# Patient Record
Sex: MALE | Race: BLACK OR AFRICAN AMERICAN | NOT HISPANIC OR LATINO | Employment: UNEMPLOYED | URBAN - METROPOLITAN AREA
[De-identification: names, ages, dates, MRNs, and addresses within clinical notes are randomized per-mention and may not be internally consistent; named-entity substitution may affect disease eponyms.]

---

## 2017-04-25 ENCOUNTER — HISTORICAL (OUTPATIENT)
Dept: RADIOLOGY | Facility: HOSPITAL | Age: 57
End: 2017-04-25

## 2017-10-17 ENCOUNTER — HISTORICAL (OUTPATIENT)
Dept: LAB | Facility: HOSPITAL | Age: 57
End: 2017-10-17

## 2021-12-23 ENCOUNTER — HISTORICAL (OUTPATIENT)
Dept: RADIOLOGY | Facility: HOSPITAL | Age: 61
End: 2021-12-23

## 2021-12-27 ENCOUNTER — HISTORICAL (OUTPATIENT)
Dept: RADIOLOGY | Facility: HOSPITAL | Age: 61
End: 2021-12-27

## 2022-04-04 ENCOUNTER — HISTORICAL (OUTPATIENT)
Dept: ADMINISTRATIVE | Facility: HOSPITAL | Age: 62
End: 2022-04-04

## 2022-04-04 ENCOUNTER — HISTORICAL (OUTPATIENT)
Dept: RADIOLOGY | Facility: HOSPITAL | Age: 62
End: 2022-04-04

## 2023-09-22 DIAGNOSIS — K11.1 HYPERTROPHY OF SALIVARY GLAND: Primary | ICD-10-CM

## 2023-09-29 ENCOUNTER — HOSPITAL ENCOUNTER (OUTPATIENT)
Dept: RADIOLOGY | Facility: HOSPITAL | Age: 63
Discharge: HOME | End: 2023-09-29
Attending: INTERNAL MEDICINE
Payer: MEDICAID

## 2023-09-29 DIAGNOSIS — K11.1 HYPERTROPHY OF SALIVARY GLAND: ICD-10-CM

## 2023-09-29 PROCEDURE — 76536 US EXAM OF HEAD AND NECK: CPT | Mod: TC

## 2023-10-18 DIAGNOSIS — K11.1 HYPERTROPHY OF SALIVARY GLAND: Primary | ICD-10-CM

## 2023-10-25 ENCOUNTER — HOSPITAL ENCOUNTER (OUTPATIENT)
Dept: RADIOLOGY | Facility: HOSPITAL | Age: 63
Discharge: HOME | End: 2023-10-25
Attending: INTERNAL MEDICINE
Payer: MEDICAID

## 2023-10-25 DIAGNOSIS — K11.1 HYPERTROPHY OF SALIVARY GLAND: ICD-10-CM

## 2023-10-25 LAB
CREAT SERPL-MCNC: 1 MG/DL (ref 0.5–1.4)
SAMPLE: NORMAL

## 2023-10-25 PROCEDURE — 25500020 PHARM REV CODE 255: Performed by: INTERNAL MEDICINE

## 2023-10-25 PROCEDURE — 70492 CT SFT TSUE NCK W/O & W/DYE: CPT | Mod: TC

## 2023-10-25 RX ADMIN — IOPAMIDOL 100 ML: 755 INJECTION, SOLUTION INTRAVENOUS at 11:10

## 2024-04-25 ENCOUNTER — LAB VISIT (OUTPATIENT)
Dept: LAB | Facility: HOSPITAL | Age: 64
End: 2024-04-25
Attending: INTERNAL MEDICINE

## 2024-04-25 DIAGNOSIS — E78.00 PURE HYPERCHOLESTEROLEMIA: Primary | ICD-10-CM

## 2024-04-25 LAB
ALBUMIN SERPL-MCNC: 4.3 G/DL (ref 3.4–4.8)
ALBUMIN/GLOB SERPL: 1.3 RATIO (ref 1.1–2)
ALP SERPL-CCNC: 63 UNIT/L (ref 40–150)
ALT SERPL-CCNC: 22 UNIT/L (ref 0–55)
AST SERPL-CCNC: 27 UNIT/L (ref 5–34)
BILIRUB SERPL-MCNC: 0.8 MG/DL
BUN SERPL-MCNC: 11 MG/DL (ref 8.4–25.7)
CALCIUM SERPL-MCNC: 9.6 MG/DL (ref 8.8–10)
CHLORIDE SERPL-SCNC: 106 MMOL/L (ref 98–107)
CO2 SERPL-SCNC: 22 MMOL/L (ref 23–31)
CREAT SERPL-MCNC: 1.02 MG/DL (ref 0.73–1.18)
GFR SERPLBLD CREATININE-BSD FMLA CKD-EPI: >60 MLS/MIN/1.73/M2
GLOBULIN SER-MCNC: 3.4 GM/DL (ref 2.4–3.5)
GLUCOSE SERPL-MCNC: 197 MG/DL (ref 82–115)
POTASSIUM SERPL-SCNC: 3.2 MMOL/L (ref 3.5–5.1)
PROT SERPL-MCNC: 7.7 GM/DL (ref 5.8–7.6)
SODIUM SERPL-SCNC: 137 MMOL/L (ref 136–145)

## 2024-04-25 PROCEDURE — 36415 COLL VENOUS BLD VENIPUNCTURE: CPT

## 2024-04-25 PROCEDURE — 80053 COMPREHEN METABOLIC PANEL: CPT

## 2024-11-11 ENCOUNTER — HOSPITAL ENCOUNTER (EMERGENCY)
Facility: HOSPITAL | Age: 64
Discharge: HOME | End: 2024-11-11
Attending: INTERNAL MEDICINE
Payer: MEDICARE

## 2024-11-11 VITALS
HEIGHT: 70 IN | RESPIRATION RATE: 16 BRPM | TEMPERATURE: 99 F | DIASTOLIC BLOOD PRESSURE: 113 MMHG | BODY MASS INDEX: 29.35 KG/M2 | SYSTOLIC BLOOD PRESSURE: 174 MMHG | OXYGEN SATURATION: 97 % | WEIGHT: 205 LBS | HEART RATE: 69 BPM

## 2024-11-11 DIAGNOSIS — R51.9 ACUTE NONINTRACTABLE HEADACHE, UNSPECIFIED HEADACHE TYPE: ICD-10-CM

## 2024-11-11 DIAGNOSIS — R40.20 LOC (LOSS OF CONSCIOUSNESS): Primary | ICD-10-CM
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
## 2024-11-11 DIAGNOSIS — T14.8XXA ABRASION: ICD-10-CM

## 2024-11-11 DIAGNOSIS — S09.90XA INJURY OF HEAD, INITIAL ENCOUNTER: ICD-10-CM

## 2024-11-11 DIAGNOSIS — S00.12XA BLACK EYE OF LEFT SIDE, INITIAL ENCOUNTER: ICD-10-CM

## 2024-11-11 PROCEDURE — 25000003 PHARM REV CODE 250

## 2024-11-11 PROCEDURE — 99284 EMERGENCY DEPT VISIT MOD MDM: CPT | Mod: 25

## 2024-11-11 RX ORDER — AMLODIPINE BESYLATE 5 MG/1
10 TABLET ORAL
Status: COMPLETED | OUTPATIENT
Start: 2024-11-11 | End: 2024-11-11

## 2024-11-11 RX ORDER — TRAMADOL HYDROCHLORIDE 50 MG/1
50 TABLET ORAL EVERY 8 HOURS PRN
Qty: 15 TABLET | Refills: 0 | Status: SHIPPED | OUTPATIENT
Start: 2024-11-11 | End: 2024-11-16

## 2024-11-11 RX ORDER — BUTALBITAL, ACETAMINOPHEN AND CAFFEINE 50; 325; 40 MG/1; MG/1; MG/1
1 TABLET ORAL
Status: COMPLETED | OUTPATIENT
Start: 2024-11-11 | End: 2024-11-11

## 2024-11-11 RX ADMIN — AMLODIPINE BESYLATE 10 MG: 5 TABLET ORAL at 05:11

## 2024-11-11 RX ADMIN — BUTALBITAL, ACETAMINOPHEN, AND CAFFEINE 1 TABLET: 50; 325; 40 TABLET ORAL at 04:11

## 2024-11-11 NOTE — DISCHARGE INSTRUCTIONS
Take Tylenol or ibuprofen as needed for pain.  Take tramadol as needed for more severe pain.  May also apply ice to your face as needed for pain and swelling.  Follow up with your primary care provider as needed.  If you experience any new or worsening symptoms return to the emergency department

## 2024-11-11 NOTE — ED PROVIDER NOTES
Encounter Date: 11/11/2024       History     Chief Complaint   Patient presents with    Fall    Loss of Consciousness     C/o possibly passing out Saturday night and fell hitting left eye     See MDM        Review of patient's allergies indicates:   Allergen Reactions    Codeine     Penicillins Other (See Comments)     History reviewed. No pertinent past medical history.  No past surgical history on file.  No family history on file.  Social History     Tobacco Use    Smoking status: Former     Types: Cigarettes    Smokeless tobacco: Never     Review of Systems   Constitutional:         Fall with LOC on saturday   Eyes:  Positive for pain (pain and swelling over L eye).   Neurological:  Positive for headaches.   Psychiatric/Behavioral:  Positive for confusion.    All other systems reviewed and are negative.      Physical Exam     Initial Vitals [11/11/24 1515]   BP Pulse Resp Temp SpO2   (!) 184/109 82 16 98.6 °F (37 °C) 99 %      MAP       --         Physical Exam    Nursing note and vitals reviewed.  Constitutional: He appears well-developed and well-nourished.   HENT:   Head: Normocephalic.   Eyes: EOM are normal. Pupils are equal, round, and reactive to light.       Contusion with associated swelling and abrasion noted to the L surrounding eye    Cardiovascular:  Normal rate and regular rhythm.           Pulmonary/Chest: Breath sounds normal.   Musculoskeletal:      Cervical back: No tenderness or bony tenderness.      Thoracic back: No tenderness or bony tenderness.      Lumbar back: No tenderness or bony tenderness.     Neurological: He is alert and oriented to person, place, and time. He has normal strength. No cranial nerve deficit or sensory deficit. He displays a negative Romberg sign. Coordination and gait normal.   Skin: Skin is warm and dry.   Psychiatric: He has a normal mood and affect.         ED Course   Procedures  Labs Reviewed - No data to display       Imaging Results              CT Head Without  Contrast (Final result)  Result time 11/11/24 16:28:40      Final result by Yomi Palacios MD (11/11/24 16:28:40)                   Impression:      No acute intracranial abnormality is identified.  Soft tissue hematoma is identified superior and lateral to the left orbit without underlying bony fracture      Electronically signed by: Yomi Palacios  Date:    11/11/2024  Time:    16:28               Narrative:    EXAMINATION:  CT HEAD WITHOUT CONTRAST    CLINICAL HISTORY:  Facial trauma, blunt;    TECHNIQUE:  Low dose axial CT images obtained throughout the head without intravenous contrast. Sagittal and coronal reconstructions were performed.    COMPARISON:  None.    FINDINGS:  Intracranial compartment:    Ventricles and sulci are normal in size for age without evidence of hydrocephalus. No extra-axial blood or fluid collections.    The brain parenchyma appears normal. No parenchymal mass, hemorrhage, edema or major vascular distribution infarct.  Intracranial atherosclerosis is identified.    Skull/extracranial contents (limited evaluation): Large soft tissue hematoma is identified along the superior and lateral aspect of the left orbit.  No underlying bony fracture is seen.  Tiny radiopaque foreign bodies are identified along the skin along the temporal region on the left.  Mastoid air cells and paranasal sinuses are essentially clear.                                       CT Maxillofacial Without Contrast (Final result)  Result time 11/11/24 16:30:50      Final result by Yomi Palacios MD (11/11/24 16:30:50)                   Impression:      Soft tissue hematoma is identified lateral to the left orbit.  No acute bony fractures are seen.      Electronically signed by: Yomi Palacios  Date:    11/11/2024  Time:    16:30               Narrative:    EXAMINATION:  CT MAXILLOFACIAL WITHOUT CONTRAST    CLINICAL HISTORY:  Facial trauma, blunt;    TECHNIQUE:  Low dose axial images, sagittal and  coronal reformations were obtained through the face.  Contrast was not administered.    Total DLP: 416 mGy.cm    Automatic exposure control was utilized to reduce the patient's dose    COMPARISON:  None    FINDINGS:  Subcutaneous scalp hematoma is identified along the lateral and superior portions of the left orbit.  The globes and intraorbital contents appear unremarkable.  Tiny radiopacities are identified in the skin at the level of the zygomatic arch.  This may represent tiny foreign bodies.  Correlation with physical exam is required.  No retropharyngeal masses or soft tissue swelling are identified.  The paranasal sinuses appear unremarkable.  The orbits appear normal.  No acute bony fractures are seen.  The mandible and temporomandibular joints appear unremarkable.                                       CT Cervical Spine Without Contrast (Final result)  Result time 11/11/24 16:29:26   Procedure changed from CT Soft Tissue Neck WO Contrast     Final result by Galo Sheridan MD (11/11/24 16:29:26)                   Impression:      1. No acute osseous defect identified  2. Cervical spondylosis  3. Straightening of normal lordotic curve with the mild dextro convexity of the cervical spine  4. Findings and other details as above      Electronically signed by: Galo Sheridan  Date:    11/11/2024  Time:    16:29               Narrative:    EXAMINATION:  CT CERVICAL SPINE WITHOUT CONTRAST    CLINICAL HISTORY:  , Fall with head trauma 64yo;    TECHNIQUE,:  PATIENT RADIATION DOSE: DLP(mGycm) 1769    As per PQRS measures, all CT scans at this facility used dose modulation, iterative reconstruction, and/or weight based dose adjustment when appropriate to reduce radiation dose to as low as reasonably achievable.    COMPARISON:  None available    FINDINGS:  Serial axial images were obtained of the cervical spine without the administration of intravenous contrast.  Additional coronal and sagittal images were performed.   "Both soft tissue and bone windows were obtained. Vertebral body height and alignment are grossly intact.  No fracture or subluxation is seen.  There is no loss of integrity to the bony spinal canal.  There is straightening of the normal lordotic curve.  There is slight dextro convexity of the cervical spine.  There is multilevel mild-to-moderate encroachment into the central spinal canal and neural foramina secondary to posterior osteophyte formation, facet the arthrosis, and poorly visualized posterior disc bulge.  Atherosclerosis is seen within the carotid bulbs bilaterally.  Thyroid lobes are relatively symmetric in size.  Lung apices are mostly clear.                                       Medications   butalbital-acetaminophen-caffeine -40 mg per tablet 1 tablet (1 tablet Oral Given 11/11/24 1643)   amLODIPine tablet 10 mg (10 mg Oral Given 11/11/24 1735)     Medical Decision Making  This is a 63-year-old  male with a PMHx of HTN, DM, HLD, anxiety who presents to the emergency department with his wife reporting a fall that occurred this past Saturday.  Patient states that he was at home lying in the recliner and whenever he got up to get out of the chair he tripped and fell and hit his head on the side low table.  Patient states that he was "out for hours".  Patient states that he received a text message on his phone prior to the fall(around 4:00 p.m.) and then received a call from his wife around 6:00 p.m. which is about the time he was regaining consciousness.  Patient's wife states that her  sounded "confused, and fatigued".  Patient states it took him a few minutes to come back to normal.  Patient has also a headache since the fall and has been taken Tylenol for this.  He endorses dizziness as well as some confusion.  He denies any nausea, vomiting, changes in vision, or any other symptoms at this time.    Physical exam pertinent for left eye contusion with associated swelling, " "and abrasion.  CT cervical, CT head, CT maxillofacial ordered due to mechanism of injury , symptoms, and age of the patient.  Fioricet given for headache.  Patient noted to be hypertensive to the 180s / 100s - patient states he took all his medications today.    CT cervical, CT head, CT maxillofacial all without any acute abnormalities.  Patient given a dose of amlodipine 10 for blood pressure while inpatient.  Patient given discharge instructions and return precautions.  Patient is wife verbalized understanding agreement of plan.  All questions answered.      Amount and/or Complexity of Data Reviewed  Independent Historian: spouse     Details: This is a 63-year-old  male with a PMHx of HTN, DM, HLD, anxiety who presents to the emergency department with his wife reporting a fall that occurred this past Saturday.  Patient states that he was at home lying in the recliner and whenever he got up to get out of the chair he tripped and fell and hit his head on the side low table.  Patient states that he was "out for hours".  Patient states that he received a text message on his phone prior to the fall(around 4:00 p.m.) and then received a call from his wife around 6:00 p.m. which is about the time he was regaining consciousness.  Patient's wife states that her  sounded "confused, and fatigued".  Patient states it took him a few minutes to come back to normal.  Patient has also a headache since the fall and has been taken Tylenol for this.  He endorses dizziness as well as some confusion.  He denies any nausea, vomiting, changes in vision, or any other symptoms at this time.  Radiology: ordered. Decision-making details documented in ED Course.    Risk  Prescription drug management.      Additional MDM:   Differential Diagnosis:   Symptom: Headache. <> The follow diagnoses were considered and will be evaluated: Intracranial Hemorrhage, Post Trauma HA and Subdural Hematoma.                             "         Clinical Impression:  Final diagnoses:  [R40.20] LOC (loss of consciousness) (Primary)  [W19.XXXA] Fall, initial encounter  [T14.8XXA] Abrasion  [S09.90XA] Injury of head, initial encounter  [R51.9] Acute nonintractable headache, unspecified headache type  [S00.12XA] Black eye of left side, initial encounter          ED Disposition Condition    Discharge Stable          ED Prescriptions       Medication Sig Dispense Start Date End Date Auth. Provider    traMADoL (ULTRAM) 50 mg tablet Take 1 tablet (50 mg total) by mouth every 8 (eight) hours as needed for Pain. 15 tablet 11/11/2024 11/16/2024 Brianne Ortega PA-C          Follow-up Information       Follow up With Specialties Details Why Contact Info    Richard Galvez MD Internal Medicine Call in 1 week As needed [address] [facility]  [place], [facility] [address]  [phone]               Brianne Ortega PA-C  11/11/24 6849

## 2024-11-11 NOTE — Clinical Note
"Jason Coronado" Arvin was seen and treated in our emergency department on 11/11/2024.  He may return to work on 11/18/2024.       If you have any questions or concerns, please don't hesitate to call.      Brianne Ortega PA-C"

## 2024-11-13 ENCOUNTER — PATIENT MESSAGE (OUTPATIENT)
Dept: RESEARCH | Facility: HOSPITAL | Age: 64
End: 2024-11-13
Payer: MEDICARE

## 2025-01-13 ENCOUNTER — HOSPITAL ENCOUNTER (EMERGENCY)
Facility: HOSPITAL | Age: 65
Discharge: HOME | End: 2025-01-13
Attending: EMERGENCY MEDICINE
Payer: COMMERCIAL

## 2025-01-13 VITALS
DIASTOLIC BLOOD PRESSURE: 102 MMHG | HEIGHT: 70 IN | TEMPERATURE: 98 F | SYSTOLIC BLOOD PRESSURE: 175 MMHG | HEART RATE: 78 BPM | OXYGEN SATURATION: 96 % | BODY MASS INDEX: 28.63 KG/M2 | WEIGHT: 200 LBS | RESPIRATION RATE: 16 BRPM

## 2025-01-13 DIAGNOSIS — S05.12XA CONTUSION OF LEFT ORBIT, INITIAL ENCOUNTER: ICD-10-CM
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
## 2025-01-13 DIAGNOSIS — R51.9 NONINTRACTABLE HEADACHE, UNSPECIFIED CHRONICITY PATTERN, UNSPECIFIED HEADACHE TYPE: ICD-10-CM

## 2025-01-13 DIAGNOSIS — T07.XXXA MULTIPLE ABRASIONS: ICD-10-CM

## 2025-01-13 DIAGNOSIS — S30.1XXA CONTUSION OF ABDOMINAL WALL, INITIAL ENCOUNTER: ICD-10-CM

## 2025-01-13 LAB
GLUCOSE SERPL-MCNC: 166 MG/DL (ref 70–110)
POC ANION GAP: 16 MMOL/L
POC BUN: 8 MMOL/L
POC CHLORIDE: 99 MMOL/L
POC CREATININE: 0.8 MG/DL (ref 0.6–1.3)
POC HEMATOCRIT: 42 %PCV (ref 42–52)
POC HEMOGLOBIN: 14.3 G/DL (ref 13.5–18)
POC ICA: 1.17 MMOL/L
POC POTASSIUM: 3.5 MMOL/L
POC SODIUM: 140 MMOL/L
POC TCO2: 29 MMOL/L

## 2025-01-13 PROCEDURE — 82565 ASSAY OF CREATININE: CPT | Mod: 59

## 2025-01-13 PROCEDURE — 99285 EMERGENCY DEPT VISIT HI MDM: CPT | Mod: 25

## 2025-01-13 PROCEDURE — 25000003 PHARM REV CODE 250: Performed by: NURSE PRACTITIONER

## 2025-01-13 PROCEDURE — 25500020 PHARM REV CODE 255: Performed by: NURSE PRACTITIONER

## 2025-01-13 PROCEDURE — 96374 THER/PROPH/DIAG INJ IV PUSH: CPT

## 2025-01-13 PROCEDURE — 63600175 PHARM REV CODE 636 W HCPCS: Performed by: NURSE PRACTITIONER

## 2025-01-13 PROCEDURE — 80047 BASIC METABLC PNL IONIZED CA: CPT

## 2025-01-13 PROCEDURE — 82962 GLUCOSE BLOOD TEST: CPT

## 2025-01-13 RX ORDER — KETOROLAC TROMETHAMINE 30 MG/ML
30 INJECTION, SOLUTION INTRAMUSCULAR; INTRAVENOUS
Status: COMPLETED | OUTPATIENT
Start: 2025-01-13 | End: 2025-01-13

## 2025-01-13 RX ORDER — NAPROXEN 500 MG/1
500 TABLET ORAL 2 TIMES DAILY WITH MEALS
Qty: 60 TABLET | Refills: 0 | Status: SHIPPED | OUTPATIENT
Start: 2025-01-13

## 2025-01-13 RX ORDER — TRAMADOL HYDROCHLORIDE 50 MG/1
50 TABLET ORAL EVERY 6 HOURS PRN
Qty: 12 TABLET | Refills: 0 | Status: SHIPPED | OUTPATIENT
Start: 2025-01-13

## 2025-01-13 RX ORDER — METHOCARBAMOL 500 MG/1
1000 TABLET, FILM COATED ORAL 3 TIMES DAILY PRN
Qty: 30 TABLET | Refills: 0 | Status: SHIPPED | OUTPATIENT
Start: 2025-01-13 | End: 2025-01-18

## 2025-01-13 RX ORDER — METHOCARBAMOL 500 MG/1
1000 TABLET, FILM COATED ORAL
Status: COMPLETED | OUTPATIENT
Start: 2025-01-13 | End: 2025-01-13

## 2025-01-13 RX ADMIN — METHOCARBAMOL 1000 MG: 500 TABLET ORAL at 05:01

## 2025-01-13 RX ADMIN — KETOROLAC TROMETHAMINE 30 MG: 30 INJECTION, SOLUTION INTRAMUSCULAR at 05:01

## 2025-01-13 RX ADMIN — IOHEXOL 100 ML: 350 INJECTION, SOLUTION INTRAVENOUS at 06:01

## 2025-01-14 LAB
ANION GAP SERPL CALC-SCNC: 16 MMOL/L (ref 8–16)
BUN SERPL-MCNC: 8 MG/DL (ref 6–30)
CHLORIDE SERPL-SCNC: 99 MMOL/L (ref 95–110)
CREAT SERPL-MCNC: 0.8 MG/DL (ref 0.5–1.4)
GLUCOSE SERPL-MCNC: 166 MG/DL (ref 70–110)
HCT VFR BLD CALC: 42 %PCV (ref 36–54)
HGB BLD-MCNC: 14 G/DL
POC IONIZED CALCIUM: 1.17 MMOL/L (ref 1.06–1.42)
POC TCO2 (MEASURED): 29 MMOL/L (ref 23–29)
POTASSIUM BLD-SCNC: 3.5 MMOL/L (ref 3.5–5.1)
SAMPLE: ABNORMAL
SODIUM BLD-SCNC: 140 MMOL/L (ref 136–145)

## 2025-01-14 NOTE — DISCHARGE INSTRUCTIONS
Ice to eye. Naproxen for pain, take with food. Methocarbamol for muscle spasms/tightness. Tramadol for more severe pain as needed, take with food. Do not take and drive.
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
